# Patient Record
Sex: MALE | Race: WHITE | URBAN - NONMETROPOLITAN AREA
[De-identification: names, ages, dates, MRNs, and addresses within clinical notes are randomized per-mention and may not be internally consistent; named-entity substitution may affect disease eponyms.]

---

## 2020-04-20 ENCOUNTER — HOSPITAL ENCOUNTER (INPATIENT)
Age: 35
LOS: 2 days | Discharge: HOME | DRG: 751 | End: 2020-04-22
Attending: PSYCHIATRY & NEUROLOGY | Admitting: PSYCHIATRY & NEUROLOGY
Payer: COMMERCIAL

## 2020-04-20 PROBLEM — F29 PSYCHOSIS (HCC): Status: ACTIVE | Noted: 2020-04-20

## 2020-04-20 LAB
ALBUMIN SERPL-MCNC: 4.6 G/DL (ref 3.5–5.1)
ALP BLD-CCNC: 87 U/L (ref 38–126)
ALT SERPL-CCNC: 14 U/L (ref 11–66)
ANION GAP SERPL CALCULATED.3IONS-SCNC: 14 MEQ/L (ref 8–16)
AST SERPL-CCNC: 18 U/L (ref 5–40)
BASOPHILS # BLD: 0.5 %
BASOPHILS ABSOLUTE: 0.1 THOU/MM3 (ref 0–0.1)
BILIRUB SERPL-MCNC: 0.5 MG/DL (ref 0.3–1.2)
BILIRUBIN DIRECT: < 0.2 MG/DL (ref 0–0.3)
BUN BLDV-MCNC: 9 MG/DL (ref 7–22)
CALCIUM SERPL-MCNC: 9.7 MG/DL (ref 8.5–10.5)
CHLORIDE BLD-SCNC: 100 MEQ/L (ref 98–111)
CO2: 22 MEQ/L (ref 23–33)
CREAT SERPL-MCNC: 0.7 MG/DL (ref 0.4–1.2)
EOSINOPHIL # BLD: 1.1 %
EOSINOPHILS ABSOLUTE: 0.1 THOU/MM3 (ref 0–0.4)
ERYTHROCYTE [DISTWIDTH] IN BLOOD BY AUTOMATED COUNT: 13.5 % (ref 11.5–14.5)
ERYTHROCYTE [DISTWIDTH] IN BLOOD BY AUTOMATED COUNT: 40.9 FL (ref 35–45)
ETHYL ALCOHOL, SERUM: < 0.01 %
GFR SERPL CREATININE-BSD FRML MDRD: > 90 ML/MIN/1.73M2
GLUCOSE BLD-MCNC: 102 MG/DL (ref 70–108)
HCT VFR BLD CALC: 46.6 % (ref 42–52)
HEMOGLOBIN: 15.6 GM/DL (ref 14–18)
IMMATURE GRANS (ABS): 0.02 THOU/MM3 (ref 0–0.07)
IMMATURE GRANULOCYTES: 0.2 %
LYMPHOCYTES # BLD: 18.5 %
LYMPHOCYTES ABSOLUTE: 1.9 THOU/MM3 (ref 1–4.8)
MAGNESIUM: 2 MG/DL (ref 1.6–2.4)
MCH RBC QN AUTO: 27.9 PG (ref 26–33)
MCHC RBC AUTO-ENTMCNC: 33.5 GM/DL (ref 32.2–35.5)
MCV RBC AUTO: 83.2 FL (ref 80–94)
MONOCYTES # BLD: 6.6 %
MONOCYTES ABSOLUTE: 0.7 THOU/MM3 (ref 0.4–1.3)
NUCLEATED RED BLOOD CELLS: 0 /100 WBC
OSMOLALITY CALCULATION: 270.8 MOSMOL/KG (ref 275–300)
PLATELET # BLD: 302 THOU/MM3 (ref 130–400)
PMV BLD AUTO: 9.5 FL (ref 9.4–12.4)
POTASSIUM SERPL-SCNC: 3.9 MEQ/L (ref 3.5–5.2)
RBC # BLD: 5.6 MILL/MM3 (ref 4.7–6.1)
SEG NEUTROPHILS: 73.1 %
SEGMENTED NEUTROPHILS ABSOLUTE COUNT: 7.4 THOU/MM3 (ref 1.8–7.7)
SODIUM BLD-SCNC: 136 MEQ/L (ref 135–145)
TOTAL PROTEIN: 7.5 G/DL (ref 6.1–8)
TSH SERPL DL<=0.05 MIU/L-ACNC: 0.92 UIU/ML (ref 0.4–4.2)
WBC # BLD: 10.1 THOU/MM3 (ref 4.8–10.8)

## 2020-04-20 PROCEDURE — 82248 BILIRUBIN DIRECT: CPT

## 2020-04-20 PROCEDURE — 83735 ASSAY OF MAGNESIUM: CPT

## 2020-04-20 PROCEDURE — G0480 DRUG TEST DEF 1-7 CLASSES: HCPCS

## 2020-04-20 PROCEDURE — 36415 COLL VENOUS BLD VENIPUNCTURE: CPT

## 2020-04-20 PROCEDURE — 85025 COMPLETE CBC W/AUTO DIFF WBC: CPT

## 2020-04-20 PROCEDURE — 99282 EMERGENCY DEPT VISIT SF MDM: CPT

## 2020-04-20 PROCEDURE — 80053 COMPREHEN METABOLIC PANEL: CPT

## 2020-04-20 PROCEDURE — 1240000000 HC EMOTIONAL WELLNESS R&B

## 2020-04-20 PROCEDURE — 84443 ASSAY THYROID STIM HORMONE: CPT

## 2020-04-20 RX ORDER — QUETIAPINE FUMARATE 100 MG/1
100 TABLET, FILM COATED ORAL 2 TIMES DAILY
Status: DISCONTINUED | OUTPATIENT
Start: 2020-04-20 | End: 2020-04-22 | Stop reason: HOSPADM

## 2020-04-20 RX ORDER — HALOPERIDOL 5 MG
5 TABLET ORAL EVERY 8 HOURS PRN
Status: DISCONTINUED | OUTPATIENT
Start: 2020-04-20 | End: 2020-04-22 | Stop reason: HOSPADM

## 2020-04-20 RX ORDER — MAGNESIUM HYDROXIDE/ALUMINUM HYDROXICE/SIMETHICONE 120; 1200; 1200 MG/30ML; MG/30ML; MG/30ML
30 SUSPENSION ORAL EVERY 6 HOURS PRN
Status: DISCONTINUED | OUTPATIENT
Start: 2020-04-20 | End: 2020-04-22 | Stop reason: HOSPADM

## 2020-04-20 RX ORDER — HYDROXYZINE HYDROCHLORIDE 25 MG/1
50 TABLET, FILM COATED ORAL 3 TIMES DAILY PRN
Status: DISCONTINUED | OUTPATIENT
Start: 2020-04-20 | End: 2020-04-22 | Stop reason: HOSPADM

## 2020-04-20 RX ORDER — ZIPRASIDONE MESYLATE 20 MG/ML
20 INJECTION, POWDER, LYOPHILIZED, FOR SOLUTION INTRAMUSCULAR ONCE
Status: DISCONTINUED | OUTPATIENT
Start: 2020-04-20 | End: 2020-04-22 | Stop reason: HOSPADM

## 2020-04-20 RX ORDER — TRAZODONE HYDROCHLORIDE 50 MG/1
50 TABLET ORAL NIGHTLY PRN
Status: DISCONTINUED | OUTPATIENT
Start: 2020-04-20 | End: 2020-04-22 | Stop reason: HOSPADM

## 2020-04-20 RX ORDER — LORAZEPAM 1 MG/1
2 TABLET ORAL EVERY 8 HOURS PRN
Status: DISCONTINUED | OUTPATIENT
Start: 2020-04-20 | End: 2020-04-22 | Stop reason: HOSPADM

## 2020-04-20 RX ORDER — ACETAMINOPHEN 325 MG/1
650 TABLET ORAL EVERY 4 HOURS PRN
Status: DISCONTINUED | OUTPATIENT
Start: 2020-04-20 | End: 2020-04-22 | Stop reason: HOSPADM

## 2020-04-20 RX ORDER — HALOPERIDOL 5 MG/ML
5 INJECTION INTRAMUSCULAR EVERY 8 HOURS PRN
Status: DISCONTINUED | OUTPATIENT
Start: 2020-04-20 | End: 2020-04-22 | Stop reason: HOSPADM

## 2020-04-20 RX ORDER — IBUPROFEN 200 MG
400 TABLET ORAL EVERY 6 HOURS PRN
Status: DISCONTINUED | OUTPATIENT
Start: 2020-04-20 | End: 2020-04-22 | Stop reason: HOSPADM

## 2020-04-20 RX ORDER — LORAZEPAM 2 MG/ML
2 INJECTION INTRAMUSCULAR EVERY 8 HOURS PRN
Status: DISCONTINUED | OUTPATIENT
Start: 2020-04-20 | End: 2020-04-22 | Stop reason: HOSPADM

## 2020-04-20 SDOH — HEALTH STABILITY: MENTAL HEALTH: HOW OFTEN DO YOU HAVE A DRINK CONTAINING ALCOHOL?: NEVER

## 2020-04-20 ASSESSMENT — SLEEP AND FATIGUE QUESTIONNAIRES
AVERAGE NUMBER OF SLEEP HOURS: 8
DO YOU HAVE DIFFICULTY SLEEPING: COMMENT
DO YOU USE A SLEEP AID: NO
DO YOU HAVE DIFFICULTY SLEEPING: NO

## 2020-04-20 ASSESSMENT — LIFESTYLE VARIABLES: HISTORY_ALCOHOL_USE: NO

## 2020-04-20 ASSESSMENT — ENCOUNTER SYMPTOMS
NAUSEA: 0
ABDOMINAL DISTENTION: 0
SORE THROAT: 0
EYE PAIN: 0
SHORTNESS OF BREATH: 0
COUGH: 0
SINUS PRESSURE: 0
VOMITING: 0
ABDOMINAL PAIN: 0
SINUS PAIN: 0
CONSTIPATION: 0
DIARRHEA: 0
BLOOD IN STOOL: 0

## 2020-04-20 ASSESSMENT — PAIN SCALES - GENERAL
PAINLEVEL_OUTOF10: 0
PAINLEVEL_OUTOF10: 0

## 2020-04-20 NOTE — ED TRIAGE NOTES
Patient brought in by police for psychiatric evaluation. Patient refusing all care from staff. [place] police at bedside. Patient is E[facility] per [facility].

## 2020-04-20 NOTE — PROGRESS NOTES
Provisional Diagnosis:   Psychosis NOS    Risk, Psychosocial and Contextual Factors: Patient reports jobs are slim in [facility]    [facility]: [facility], due to patient psychosis      Present Suicidal Behavior:      Verbal: patient denies         Attempt: patient denies    Access to Weapons: ARTEM     Current Suicide Risk: Low, Moderate or High: Low       Past Suicidal Behavior:       Verbal: patient denies    Attempt: patient denies    Self-Injurious/Self-Mutilation:  patient denies    Traumatic Event Within Past 2 Weeks:   ARTEM, due to patient psychosis    Current Abuse: ARTEM, due to patient psychosis    Legal: None noted    Violence: None witnessed. Protective Factors: Patient has access to others    Housing: Adequate    CPAP/Oxygen/Ambulation Difficulties: None noted in EPIC    Clinical Summary:      Patient is a 37year old male who presents to the ED on [facility] via [facility]. Per [facility], \"Vance stated over 911 that his girlfriend signaled to him that someone was in her residence. Upon my arrival, Deputies spoke with Svetlana Anaya who stated he believes someone is after him, trying to harm him. When asked, who was after him, he stated \"them\" and would not give any further information. DepRaulito Foster spoke with the girlfriend who stated at approximately 0300 hours, Svetlana Anaya began [facility]\" and said someone was at the residence. Deputies felt Svetlana Anaya was delusional and unable to care for himself\". When patient arrived to the ED, patient was suspicious and paranoid. Pt was noted to barricade his safe room door with his bed. Patient requested a cell phone stating his 'homeboy' (referencing room 25) has a cell phone. Patient noted to be delusional. Clinician spoke with patient who states he was at the hospital in [facility] last night and was assessed and discharged. Patient later stated he was actually in [place]. Patient's address in [facility] is noted to be [facility]. Patient reports he is from [facility] [place].  Patient reports he has family (moms side) from [place] and was visiting due to 'chilling' with a girl he was talking to. Patient denies suicidal thought. Homicidal thoughts and/or plans denied. Hallucinations denied. Patient reports using marijuana. Patient denied feeling depressed and feelings of paranoia. Level of Care Disposition:    3980  Patient consented to blood work. [phone]  Patient provided cell phone. Pt requesting phone book to call [employer] to 'get his affairs straight if anything was to happen. '  1347  Consulted with medical provider. Patient is medically cleared. Patient has been asked to provide urine multiple times and patient has not produced a urine specimen. [address] with Dr. Johnnie Cole. Patient to be admitted to . Patient is [facility]. 1410  Patient noted to be standing on his bed in the safe room. Officer Dre into patients room. Pt easily directed. [address] report provided to Kaley Prakash on 4E. ED provider informed. 1436  Patient noted to be in safe room 25 (not his own room) peering under the door. 1438  Patient handed urine specimen cup and encouraged to provide urine sample. 65  Pt requesting to see Clinician asking what the MRN and CSN stands for on his urine specimen bottle. Patient then asked Clinician to dial a phone number. Pt's eyes noted to be darting around the urine specimen bottle looking at numbers. Patient unable to provide a full phone number.

## 2020-04-20 NOTE — ED PROVIDER NOTES
VITALS:  vitals were not taken for this visit. Physical Exam  Vitals signs and nursing note reviewed. Constitutional:       Appearance: He is well-developed. HENT:      Head: Normocephalic and atraumatic. Right Ear: External ear normal.      Left Ear: External ear normal.      Nose: Nose normal.   Eyes:      Pupils: Pupils are equal, round, and reactive to light. Neck:      Musculoskeletal: Normal range of motion. Cardiovascular:      Rate and Rhythm: Tachycardia present. Rhythm irregular. Pulmonary:      Effort: Pulmonary effort is normal. No respiratory distress. Breath sounds: Normal breath sounds. No wheezing. Abdominal:      General: Bowel sounds are normal. There is no distension. Palpations: Abdomen is soft. Tenderness: There is no abdominal tenderness. Skin:     General: Skin is warm. Neurological:      Mental Status: He is alert and oriented to person, place, and time. Cranial Nerves: No cranial nerve deficit. Coordination: Coordination normal.      Deep Tendon Reflexes: Reflexes normal.   Psychiatric:      Comments: Patient is very paranoid. His insight is poor. He cannot make clear or sound decisions at this point. He is not homicidal or suicidal.         DIFFERENTIAL DIAGNOSIS:   Acute psychosis.     DIAGNOSTIC RESULTS     EKG: All EKG's are interpreted by the Emergency Department Physician who either signs or Co-signs this chart in the absence of a cardiologist.      RADIOLOGY: non-plain film images(s) such as CT, Ultrasound and MRI are read by the radiologist.  None      LABS:   Labs Reviewed   BASIC METABOLIC PANEL - Abnormal; Notable for the following components:       Result Value    CO2 22 (*)     All other components within normal limits   OSMOLALITY - Abnormal; Notable for the following components:    Osmolality Calc 270.8 (*)     All other components within normal limits   CBC WITH AUTO DIFFERENTIAL   HEPATIC FUNCTION PANEL   ETHANOL   MAGNESIUM

## 2020-04-20 NOTE — ED NOTES
Patient placed in safe room that is ligature resistant with continuous monitoring in place. Provider notified, requested an assessment by behavioral health . Patient belongings secured in a locked lockers outside of the room. Explained suicide prevention precautions to the patient including constant observer.       Susanne Garcia RN  04/20/20 5557

## 2020-04-21 PROBLEM — F23 BRIEF PSYCHOTIC DISORDER (HCC): Status: ACTIVE | Noted: 2020-04-20

## 2020-04-21 PROCEDURE — 1240000000 HC EMOTIONAL WELLNESS R&B

## 2020-04-21 PROCEDURE — APPSS30 APP SPLIT SHARED TIME 16-30 MINUTES: Performed by: PHYSICIAN ASSISTANT

## 2020-04-21 PROCEDURE — 99223 1ST HOSP IP/OBS HIGH 75: CPT | Performed by: PSYCHIATRY & NEUROLOGY

## 2020-04-21 ASSESSMENT — SLEEP AND FATIGUE QUESTIONNAIRES
DIFFICULTY FALLING ASLEEP: YES
DO YOU HAVE DIFFICULTY SLEEPING: NO
AVERAGE NUMBER OF SLEEP HOURS: 8
DO YOU USE A SLEEP AID: NO
RESTFUL SLEEP: NO
DIFFICULTY STAYING ASLEEP: NO
SLEEP PATTERN: RESTLESSNESS
DIFFICULTY ARISING: YES

## 2020-04-21 ASSESSMENT — PAIN SCALES - GENERAL
PAINLEVEL_OUTOF10: 0
PAINLEVEL_OUTOF10: 0

## 2020-04-21 ASSESSMENT — LIFESTYLE VARIABLES: HISTORY_ALCOHOL_USE: NO

## 2020-04-21 NOTE — PLAN OF CARE
Problem: KNOWLEDGE DEFICIT,EDUCATION,DISCHARGE PLAN  Goal: Knowledge - personal safety  4/21/2020 1833 by Arleth Christine RN  Outcome: Completed  Note: 1. Warning signs that happen when I am distressed or experience harmful thoughts   I don't have any harmful thoughts. 2.  Activities that I can do to cope in a healthy way when I am stressed or distressed   I run with my dog  3. List of roadblocks that keep me from using healthy coping skills  There is no one. 4. List of my support persons  Brothers, girlfriend, step dad.     EMERGENCY CONTACTS:  -National suicide prevention life line 0-246-852-237.170.7090  -24 hour crisis line 1-780-HOPE (4822)  -Domestic violence hotline 3-049-549-SAFE (94 886 971)  -Test CONNECT to 817448 to chat with a trained crisis counselor 24 hours/day 7 days a week  -CALL 969

## 2020-04-21 NOTE — BH NOTE
INPATIENT RECREATIONAL THERAPY  ADULT BEHAVIORAL SERVICES  EVALUATION    REFERRING PHYSICIAN:   Dr. Junior Payne  DIAGNOSIS:   Psychosis  PRECAUTIONS:   Standard precautions    HISTORY OF PRESENT ILLNESS/INJURY:  Patient was admitted to the unit due to paranoia and delusional thoughts. Patient stated that he believes that people are out to harm him. Patient also reported that he thought that someone was breaking into his home and trying to break down his door prior to admission. The 's department [place] patient on the highway due to patient having a flat tire and he was then brought into the hospital. Patient reported that he also has relationship stress with his girlfriend. Patient was cooperative and pleasant at time of evaluation. PMH:  Please see medical chart for prior medical history, allergies, and medication    HISTORY OF PSYCHIATRIC TREATMENT:  IP: Patient reported multiple times as a child but did not disclose where. OP: Noncompliant    DATE OF BIRTH:   [birth year]  GENDER:   male  MARITAL STATUS:  Single but patient reported that he has a girlfriend. EMPLOYMENT STATUS:   Unemployed but patient shared that he is a . LIVING SITUATION/SUPPORT:  Patient was living with his girlfriend but stated that he is going to live with his brother in [facility] when discharged. EDUCATIONAL LEVEL:   GED    MEDICATION/DRUG USE:  Marijuana use. Noncompliance with medications. LEISURE INTERESTS:   Reading, arts/crafts, listening to music, painting, activities with his girlfriend, tattoo art, traveling, watching TV/Movies  ACTIVITY PREFERENCE:  No preference  ACTIVITY TYPES:    Passive. Indoor. Active. Outdoor. COGNITION:   A&Ox4    COPING:   poor  ATTENTION:  fair  RELAXATION:  Patient appeared anxious and somewhat suspicious.    SELF-ESTEEM:     poor  MOTIVATION:   good    SOCIAL SKILLS:   good  FRUSTRATION TOLERANCE:    Patient did not have a history of violent behavior noted in his

## 2020-04-21 NOTE — BH NOTE
Pt asked nurse to speak with his girlfriend Tawny so I could hear from her what really happened. Pt added Tawny to his daily progress consent. Pt's girlfriend reports that pt was putting butter knives in her door, looking out the windows, states she picked pt up at hospital in [place] this am. Pt states when they got to her house he thought someone was hiding in the house, states he is supposed to be taking medications but won't take them.

## 2020-04-21 NOTE — H&P
Psychiatric and Medical History:   Denies any significant family history        Problem Relation Age of Onset    Diabetes Mother        Psychiatric Review of Systems      ·    Obsessions and Compulsions: denies  ·    Rissa or Hypomania: denies  ·    Hallucinations: denies  ·    Panic Attacks:  denies   ·    Delusions:  denies  ·    Phobias: denies    Medical Review of Systems:  Verbally reviewed with patient in presence of nursing staff. Constitutional: Negative for chills and weight loss. HENT: Negative for ear pain and nosebleeds. Eyes: Negative for blurred vision and photophobia. Respiratory: Negative for cough, shortness of breath and wheezing. Cardiovascular: Negative for chest pain and palpitations. Gastrointestinal: Negative for abdominal pain, diarrhea and vomiting. Genitourinary: Negative for dysuria and urgency. Musculoskeletal: Negative for falls and joint pain. Skin: Negative for itching and rash. Neurological: Negative for tremors, seizures and weakness. Endo/Heme/Allergies: Does not bruise/bleed easily. All other systems reviewed and are negative. PHYSICAL EXAM:   Vitals:  /64   Pulse 81   Temp 96.4 °F (35.8 °C) (Oral)   Resp 16   Ht 5' 8\" (1.727 m)   Wt 148 lb (67.1 kg)   SpO2 100%   BMI 22.50 kg/m²     Constitutional:  Appears well-developed and well-nourished, no acute distress  HENT:   Head: Normocephalic and atraumatic. Eyes: Right eye exhibits no discharge. Left eye exhibits no discharge. Neck: Normal range of motion. Pulmonary/Chest:  No respiratory distress or accessory muscle use. Abdominal: Could not assess due to virtual visit. Musculoskeletal: Normal range of motion observed. Neurological: cranial nerves II-XII grossly in tact, normal gait and station. Cranial nerve examination done with assistance from nursing staff. Skin: He is not diaphoretic.      Mental Status Examination:    Level of consciousness:  within normal limits  Appearance:  well-appearing, hospital attire, in chair, good grooming and good hygiene  Behavior/Motor:  no abnormalities noted  Attitude toward examiner:  cooperative, attentive and good eye contact  Speech:  spontaneous, normal rate and normal volume  Mood:  Irritable  Affect:  blunted  Thought processes:  linear, goal directed and coherent  Thought content:  Denies homicidal ideation  Suicidal Ideation:  denies suicidal ideation  Delusions:  no evidence of delusions  Perceptual Disturbance:  denies any perceptual disturbance  Cognition: Patient is oriented to person, place, time and situation  Concentration: clinically adequate  Memory: intact  Insight & Judgement: fair         DSM-5 DIAGNOSIS:    Brief psychotic disorder    Patient Active Problem List   Diagnosis    Psychosis ([facility] [address]          Psychosocial and Contextual Factors:     Unemployment    Past Medical History:   Diagnosis Date    Psychiatric problem           TREATMENT PLAN  Risk Management:  close watch per standard protocol  Psychotherapy:  participation in milieu and group and individual sessions with Attending Physician,  and Physician Assistant/CNP  Reason for Admission to Psychiatric Unit:  Threat to self  Estimated length of stay:  Greater than two midnights will be required to reach therapeutic levels of medications and to stabilize mood to where step down and management in a less restrictive environment is appropriate      GENERAL PATIENT/FAMILY EDUCATION  Patient will understand basic signs and symptoms, Patient will understand benefits/risks and potential side effects from proposed meds and Patient will understand their role in recovery. Family is  active in patient's care. Patient assets that may be helpful during treatment include: Intent to participate and engage in treatment, sufficient fund of knowledge and intellect to understand and utilize treatments.     Goals:    Continue current medications as [place], living in [address] [place] [address] for last 2 days, presented for worsening paranoia and psychosis. Patient was put on an [facility] after he called 911 stating that his girlfriend has been signaling him and people are trying to kill him. He also tried to barricade himself with a bed here in the emergency department. Patient was refusing to give any urine drug screens. Patient reported that he is convinced that someone was trying to hurt his girlfriend and him. He reports living in a bad neighborhood. He reports that people are out there trying to get his money. Agree with rest of the assessment and plan as above. Royal Hamman is a 37 y.o. male being evaluated by a Virtual Visit (video visit) encounter to address concerns as mentioned above. A caregiver was present in the room along with the patient. Pursuant to the emergency declaration under the [address] [place] [address], [address] [place] [address] and the [employer] Act, this Virtual Visit was conducted with patient's (and/or legal guardian's) consent, to reduce the patient's risk of exposure to COVID-19 and provide necessary medical care. Services were provided through a video synchronous discussion virtually to substitute for in-person visit by provider. Patient is present at [address] [place] [address] on unit 4E and I am physically present at my home in [facility]     --Fernando Kaye MD on 4/21/2020 at 4:20 PM    An electronic signature was used to authenticate this note. **This report has been created using voice recognition software. It may contain minor errors which are inherent in voice recognition technology. **

## 2020-04-22 VITALS
RESPIRATION RATE: 16 BRPM | OXYGEN SATURATION: 99 % | BODY MASS INDEX: 22.43 KG/M2 | HEIGHT: 68 IN | HEART RATE: 54 BPM | TEMPERATURE: 96.6 F | SYSTOLIC BLOOD PRESSURE: 110 MMHG | WEIGHT: 148 LBS | DIASTOLIC BLOOD PRESSURE: 73 MMHG

## 2020-04-22 PROCEDURE — 5130000000 HC BRIDGE APPOINTMENT

## 2020-04-22 PROCEDURE — 99239 HOSP IP/OBS DSCHRG MGMT >30: CPT | Performed by: PSYCHIATRY & NEUROLOGY

## 2020-04-22 RX ORDER — QUETIAPINE FUMARATE 100 MG/1
100 TABLET, FILM COATED ORAL 2 TIMES DAILY
Qty: 60 TABLET | Refills: 0 | Status: SHIPPED | OUTPATIENT
Start: 2020-04-22

## 2020-04-22 ASSESSMENT — PAIN SCALES - GENERAL: PAINLEVEL_OUTOF10: 0

## 2020-04-22 NOTE — PLAN OF CARE
Problem: Altered Mood, Psychotic Behavior:  Goal: Able to verbalize decrease in frequency and intensity of hallucinations  Description: Able to verbalize decrease in frequency and intensity of hallucinations  4/21/2020 2218 by Magda Roman RN  Outcome: Met This Shift  Note: Pt denies hallucinations  4/21/2020 1431 by Jani Mcqueen RN  Outcome: Met This Shift  Note: Denies any hallucinations. None observed. Problem: Altered Mood, Psychotic Behavior:  Goal: Able to verbalize reality based thinking  Description: Able to verbalize reality based thinking  4/21/2020 2218 by Magda Roman RN  Outcome: Ongoing  Note: Pt is paranoid and suspicious, asked nurse \"did you just record me\" during assessment this evening, pt states that he received a text from someone saying that he was paid by pt's landlord to throw all his belongings out of his apartment  4/21/2020 1431 by Jani Mcqueen RN  Outcome: Ongoing  Note: Patient still believes someone is after him. Problem: Discharge Planning:  Goal: Discharged to appropriate level of care  Description: Discharged to appropriate level of care  4/21/2020 2218 by Magda Roman RN  Outcome: Ongoing  Note: Pt states he will probably go to his girlfriend's at d/c, also has own apartment  4/21/2020 1431 by Jani Mcqueen RN  Outcome: Ongoing  Note: Discharge planning in process. Problem: Social interaction  Goal: Increased social interaction  4/21/2020 1448 by Orquidea Serrano  Outcome: Ongoing     Problem: Altered Mood, Psychotic Behavior:  Goal: Compliance with prescribed medication regimen will improve  Description: Compliance with prescribed medication regimen will improve  4/21/2020 2218 by Magda Roman RN  Outcome: Not Met This Shift  Note: Pt refused seroquel, pt states he does not need medication  4/21/2020 1431 by Jani Mcqueen RN  Outcome: Not Met This Shift  Note: Refused to take any medications.       Problem: KNOWLEDGE DEFICIT,EDUCATION,DISCHARGE PLAN  Goal: Knowledge - personal safety  4/21/2020 1833 by Hong Arzate RN  Outcome: Completed  Note: 1. Warning signs that happen when I am distressed or experience harmful thoughts   I don't have any harmful thoughts. 2.  Activities that I can do to cope in a healthy way when I am stressed or distressed   I run with my dog  3. List of roadblocks that keep me from using healthy coping skills  There is no one. 4. List of my support persons  Brothers, girlfriend, step dad. EMERGENCY CONTACTS:  -National suicide prevention life line 9-899-353-192-448-7384  -24 hour crisis line 6-803-HOPE (8818)  -Domestic violence hotline 7-084-690-SAFE (24 978 716)  -Test CONNECT to 209862 to chat with a trained crisis counselor 24 hours/day 7 days a week  -CALL 911   4/21/2020 1431 by Hong Arzate RN  Outcome: Not Met This Shift  Note: Safety plan not completed so far this shift. Care plan reviewed with patient.   Patient does not verbalize understanding of the plan of care and does not contribute to goal setting

## 2020-04-22 NOTE — DISCHARGE SUMMARY
Mir. Called the  and they told me they would take me to a hotel or my girlfriend could pick me up. I told them I ain't got no money and she was intoxicated. They took me to the hospital. A psychiatrist came in. They were video chatting me like you. My girlfriend was video chatting and said theres nothing wrong with this man he's just fearing for his life. We have a thing where we blink twice. She blinked once and I looked over and someone was in the house. This dent is for people who are trying to harm themselves. Someone is trying to harm me. I asked the officer why I was being held here and I asked to see the law. He gave me this bullshit. My girlfriend wanted to get a review over again. Its recorded in [facility]. First of all, I feared for my safety because I don't belong here at all. If I was threat to myself [facility] would have kept me there. \"  Denies past psychiatric history. Denies ever being on psychotropic medication. Denies past suicide attempts and psychiatric history. Denies alcohol use. Denies illicit drug use aside from marijuana. Refused to provide urine for a UDS.      He is irritable on examination. He does not feel he needs to be admitted. He is unable to say why he was barricading himself in the safe room in the ED. He also is unable to elaborate on the break in at his home. He denies depression and anxiety. Denies current thoughts of harm to himself or others. Denies paranoia. Denies hallucinations. No evidence of delusions on examination.      I attempted to call his girlfriend for collateral information after obtaining the patient's verbal consent. Her voicemail was not set up so I was unable to leave a message. Will attempt again later today. Hospital Course:   Upon admission, Oanh Rangel was provided a safe secure environment, introduced to unit milieu. Patient participated in groups and individual therapies. Meds were adjusted as noted below.   After few

## 2020-04-22 NOTE — PROGRESS NOTES
Behavioral Health   Discharge Note    Pt discharged with followings belongings:   Dentures: None  Vision - Corrective Lenses: None  Hearing Aid: None  Jewelry: None  Body Piercings Removed: N/A  Clothing: Footwear, Shirt, Undergarments (Comment), Socks, Pants, Other (Comment)  Were All Patient Medications Collected?: Not Applicable  Other Valuables: Cell phone, Darling Raisin, Money (Comment)   Valuables sent home with patient. Valuables retrieved from safe, Security envelope number:  [id number] and returned to patient. Patient left department with staff via ambulatory. Discharged to private residence. \"An Important Message from [employer] About Your Rights\" form photocopy original from admission and provided to pt at discharge N/A. Patient education on aftercare instructions: YES  Bridge Appointment completed: Reviewed Discharge Instructions with patient. Patient verbalizes understanding and agreement with the discharge plan using the teachback method. Patient verbalize understanding of AVS:  YES. Status EXAM upon discharge:  Status and Exam  Normal: No  Facial Expression: Flat  Affect: Blunt  Level of Consciousness: Alert  Mood:Normal: No  Mood: Suspicious  Motor Activity:Normal: Yes  Interview Behavior: Evasive  Preception: Dodge to Person, Patricio Jan to Time, Dodge to Place  Attention:Normal: No  Attention: Distractible  Thought Processes: Circumstantial  Thought Content:Normal: No  Thought Content: Paranoia  Hallucinations: None  Delusions: Yes  Delusions:  Other(See Comment)(paranoid)  Memory:Normal: No  Memory: Confabulation  Insight and Judgment: No  Insight and Judgment: Poor Judgment, Poor Insight  Present Suicidal Ideation: No  Present Homicidal Ideation: No    Cayla Kincaid RN

## 2020-04-23 ENCOUNTER — TELEPHONE (OUTPATIENT)
Dept: PSYCHIATRY | Age: 35
End: 2020-04-23